# Patient Record
Sex: MALE
[De-identification: names, ages, dates, MRNs, and addresses within clinical notes are randomized per-mention and may not be internally consistent; named-entity substitution may affect disease eponyms.]

---

## 2022-10-06 PROBLEM — Z00.00 ENCOUNTER FOR PREVENTIVE HEALTH EXAMINATION: Status: ACTIVE | Noted: 2022-10-06

## 2022-10-07 ENCOUNTER — APPOINTMENT (OUTPATIENT)
Dept: ORTHOPEDIC SURGERY | Facility: CLINIC | Age: 57
End: 2022-10-07
Payer: OTHER MISCELLANEOUS

## 2022-10-07 VITALS — BODY MASS INDEX: 33.33 KG/M2 | WEIGHT: 225 LBS | HEIGHT: 69 IN

## 2022-10-07 DIAGNOSIS — Z78.9 OTHER SPECIFIED HEALTH STATUS: ICD-10-CM

## 2022-10-07 PROCEDURE — 99072 ADDL SUPL MATRL&STAF TM PHE: CPT

## 2022-10-07 PROCEDURE — 99204 OFFICE O/P NEW MOD 45 MIN: CPT | Mod: 25

## 2022-10-07 PROCEDURE — 72110 X-RAY EXAM L-2 SPINE 4/>VWS: CPT

## 2022-10-07 PROCEDURE — 20552 NJX 1/MLT TRIGGER POINT 1/2: CPT

## 2022-10-07 PROCEDURE — 72170 X-RAY EXAM OF PELVIS: CPT

## 2022-10-07 PROCEDURE — J3490M: CUSTOM

## 2022-10-07 RX ORDER — METHYLPREDNISOLONE 4 MG/1
4 TABLET ORAL
Qty: 1 | Refills: 1 | Status: ACTIVE | COMMUNITY
Start: 2022-10-07 | End: 1900-01-01

## 2022-10-07 NOTE — HISTORY OF PRESENT ILLNESS
[5] : 5 [7] : 7 [Dull/Aching] : dull/aching [Radiating] : radiating [Sharp] : sharp [Retired] : Work status: retired [de-identified] : WC DOI 3/4/14 Retired \par \par 10/7/22: 56 yo PARIS gagnon here for the evaluation of his low back pain. He reports he was lifting a heavy bag of grass at the time the pain initially started - that was the initial injury - He has gotten intermittent flare ups of pain since the injury; normally treated with ice. chirocare and rest. MRI was done at the time of the initial injury showing  herniated discs, stenosis and disc bulging. \par \par More recent flare up was 2 weeks ago without injury. Pain is to the right side of the back radiating into the right anterior thigh. There is tingling. No loss of b/b control. left leg is okay.  Pain with rest is present \par \par No hx ANASTASIA, prior spine surgery \par No acupuncuture, massage - had chirocare and PT in the past\par \par No prior surgeries\par No baseline medications\par \par No hx diabetes/cancer\par \par xrays today\par L-spine 4V: L5-S1 DDD \par AP pelvis: negative \par \par Retired [] : Post Surgical Visit: no [FreeTextEntry1] : L Spine [FreeTextEntry3] : 3/4/2014 [FreeTextEntry5] : 58 Y/O Ampidex M guillerminaal L spine S/P Injury at work  [FreeTextEntry7] : Into the R Thigh  [de-identified] : XR MRI  [de-identified] : XR MRI  [de-identified] : Sanitation

## 2022-10-07 NOTE — WORK
[Sprain/Strain] : sprain/strain [Was the competent medical cause of the injury] : was the competent medical cause of the injury [Are consistent with the injury] : are consistent with the injury [Consistent with my objective findings] : consistent with my objective findings [Does not reveal pre-existing condition(s) that may affect treatment/prognosis] : does not reveal pre-existing condition(s) that may affect treatment/prognosis [Cannot return to work because ________] : cannot return to work because [unfilled] [No Rx restrictions] : No Rx restrictions. [I actively supervised the healthcare provider named who provided these services] : I actively supervised the healthcare provider named who provided these services:

## 2022-10-07 NOTE — DISCUSSION/SUMMARY
[de-identified] : lumbar radiculopathy \par indicated for updated MRi \par MDP/flexeril \par TPI tolerated well

## 2022-10-07 NOTE — PROCEDURE
[Trigger point 1-2 muscle groups] : trigger point 1-2 muscle groups [Right] : of the right [Lumbar paraspinal muscle] : lumbar paraspinal muscle [Pain] : pain [Alcohol] : alcohol [Betadine] : betadine [Ethyl Chloride sprayed topically] : ethyl chloride sprayed topically [___ cc    1%] : Lidocaine ~Vcc of 1%  [___ cc    0.25%] : Bupivacaine (Marcaine) ~Vcc of 0.25%  [___ cc    10mg] : Triamcinolone (Kenalog) ~Vcc of 10 mg

## 2022-10-25 ENCOUNTER — FORM ENCOUNTER (OUTPATIENT)
Age: 57
End: 2022-10-25

## 2022-10-26 ENCOUNTER — APPOINTMENT (OUTPATIENT)
Dept: MRI IMAGING | Facility: CLINIC | Age: 57
End: 2022-10-26

## 2022-10-26 PROCEDURE — 99072 ADDL SUPL MATRL&STAF TM PHE: CPT

## 2022-10-26 PROCEDURE — 72148 MRI LUMBAR SPINE W/O DYE: CPT

## 2022-11-18 ENCOUNTER — APPOINTMENT (OUTPATIENT)
Dept: ORTHOPEDIC SURGERY | Facility: CLINIC | Age: 57
End: 2022-11-18

## 2022-11-18 VITALS — HEIGHT: 69 IN | WEIGHT: 235 LBS | BODY MASS INDEX: 34.8 KG/M2

## 2022-11-18 DIAGNOSIS — M47.26 OTHER SPONDYLOSIS WITH RADICULOPATHY, LUMBAR REGION: ICD-10-CM

## 2022-11-18 PROCEDURE — 99072 ADDL SUPL MATRL&STAF TM PHE: CPT

## 2022-11-18 PROCEDURE — 99214 OFFICE O/P EST MOD 30 MIN: CPT

## 2022-11-18 NOTE — HISTORY OF PRESENT ILLNESS
[0] : 0 [Dull/Aching] : dull/aching [Retired] : Work status: retired [de-identified] : WC DOI 3/4/14 Retired \par \par 10/7/22: 58 yo PARIS gagnon here for the evaluation of his low back pain. He reports he was lifting a heavy bag of grass at the time the pain initially started - that was the initial injury - He has gotten intermittent flare ups of pain since the injury; normally treated with ice. chirocare and rest. MRI was done at the time of the initial injury showing  herniated discs, stenosis and disc bulging. \par \par More recent flare up was 2 weeks ago without injury. Pain is to the right side of the back radiating into the right anterior thigh. There is tingling. No loss of b/b control. left leg is okay.  Pain with rest is present \par \par No hx ANASTASIA, prior spine surgery \par No acupuncuture, massage - had chirocare and PT in the past\par \par No prior surgeries\par No baseline medications\par \par No hx diabetes/cancer\par \par xrays today\par L-spine 4V: L5-S1 DDD \par AP pelvis: negative \par \par Retired\par \par 11/18/22: Here to review MRI - plan at last was "lumbar radiculopathy - indicated for updated MRi - MDP/flexeril - TPI tolerated well " - overall symptoms are improved - the medication helped\par \par MRI L spine: \par 1. Multiple disc herniations and extrusions most severe on the right at L1-L2 where there is caudal extension, severe right lateral recess stenosis and impingement upon intrathecal nerve roots at the L1-L2 level on the right as well as in the right lateral recess where there is severe right lateral recess stenosis posterior to the L2 vertebral body in the right.\par 2. Significant right lateral recess stenosis and right foraminal narrowing with right L3 and right exiting L2 nerve root impingement as well as the left exiting L2 nerve root impingement at L2-L3.\par 3. Mild central stenosis, left L4 nerve root impingement and bilateral exiting L3 nerve root impingement at L3-L4.\par 4. Bilateral L5 nerve root impingement left greater than right and left exiting L4 nerve root impingement at L4-L5.\par 5. Scattered degenerative endplate changes and anterior spondylosis with slight convexity of the mid to lower lumbar spine towards the right without acute fracture.\par 6. There is inflammatory change surrounding the right paracentral disc extrusion at L1-L2 suggesting acute inflammatory changes surrounding the extrusion. Consider repeat MRI to further evaluate for healing or repeat MRI with intravenous contrast material to evaluate for possible sequestered disc segment fragment as clinically indicated. [] : Post Surgical Visit: no [FreeTextEntry1] : L Spine [FreeTextEntry3] : 3/4/2014 [FreeTextEntry5] : 58 Y/O Ampidex M eval L spine S/P Injury at work. Pt reports pain is much better, has no pain currently, has some pain occasionally. [de-identified] : XR MRI  [de-identified] : XR MRI  [de-identified] : Sanitation

## 2022-11-18 NOTE — DISCUSSION/SUMMARY
[de-identified] : MRI reviewed with patient - has mutliple disc herniations - doing better clincially - will keep an eye on it

## 2022-11-18 NOTE — WORK
[Sprain/Strain] : sprain/strain [Was the competent medical cause of the injury] : was the competent medical cause of the injury [Are consistent with the injury] : are consistent with the injury [Consistent with my objective findings] : consistent with my objective findings [Does not reveal pre-existing condition(s) that may affect treatment/prognosis] : does not reveal pre-existing condition(s) that may affect treatment/prognosis [Cannot return to work because ________] : cannot return to work because [unfilled] [No Rx restrictions] : No Rx restrictions. [I actively supervised the healthcare provider named who provided these services] : I actively supervised the healthcare provider named who provided these services:  [id number]

## 2022-12-16 ENCOUNTER — APPOINTMENT (OUTPATIENT)
Dept: ORTHOPEDIC SURGERY | Facility: CLINIC | Age: 57
End: 2022-12-16

## 2022-12-16 VITALS — BODY MASS INDEX: 34.8 KG/M2 | WEIGHT: 235 LBS | HEIGHT: 69 IN

## 2022-12-16 PROCEDURE — 99072 ADDL SUPL MATRL&STAF TM PHE: CPT

## 2022-12-16 PROCEDURE — 99214 OFFICE O/P EST MOD 30 MIN: CPT | Mod: 25

## 2022-12-16 PROCEDURE — J3490M: CUSTOM

## 2022-12-16 PROCEDURE — 20552 NJX 1/MLT TRIGGER POINT 1/2: CPT

## 2022-12-16 NOTE — PROCEDURE
[Trigger point 1-2 muscle groups] : trigger point 1-2 muscle groups [Right] : of the right [Lumbar paraspinal muscle] : lumbar paraspinal muscle [Inflammation] : inflammation [Pain] : pain [Alcohol] : alcohol [Betadine] : betadine [Ethyl Chloride sprayed topically] : ethyl chloride sprayed topically [Sterile technique used] : sterile technique used [___ cc    1%] : Lidocaine ~Vcc of 1%  [___ cc    0.25%] : Bupivacaine (Marcaine) ~Vcc of 0.25%  [___ cc    10mg] : Triamcinolone (Kenalog) ~Vcc of 10 mg  [] : Patient tolerated procedure well [Call if redness, pain or fever occur] : call if redness, pain or fever occur [Apply ice for 15min out of every hour for the next 12-24 hours as tolerated] : apply ice for 15 minutes out of every hour for the next 12-24 hours as tolerated

## 2022-12-17 NOTE — DISCUSSION/SUMMARY
[de-identified] : For clarification, his current back pain is an exacerbation of and related to his case from 2014.\par TPI today - tolerated well\par \par Fu as already scheduled\par

## 2022-12-17 NOTE — HISTORY OF PRESENT ILLNESS
[3] : 3 [Intermittent] : intermittent [Household chores] : household chores [Leisure] : leisure [Injection therapy] : injection therapy [Sitting] : sitting [de-identified] : WC DOI 3/4/14 Retired \par \par 10/7/22: 56 yo PARIS gagnon here for the evaluation of his low back pain. He reports he was lifting a heavy bag of grass at the time the pain initially started - that was the initial injury - He has gotten intermittent flare ups of pain since the injury; normally treated with ice. chirocare and rest. MRI was done at the time of the initial injury showing  herniated discs, stenosis and disc bulging. \par \par More recent flare up was 2 weeks ago without injury. Pain is to the right side of the back radiating into the right anterior thigh. There is tingling. No loss of b/b control. left leg is okay.  Pain with rest is present \par \par No hx ANASTASIA, prior spine surgery \par No acupuncuture, massage - had chirocare and PT in the past\par \par No prior surgeries\par No baseline medications\par \par No hx diabetes/cancer\par \par xrays today\par L-spine 4V: L5-S1 DDD \par AP pelvis: negative \par \par Retired\par \par 11/18/22: Here to review MRI - plan at last was "lumbar radiculopathy - indicated for updated MRi - MDP/flexeril - TPI tolerated well " - overall symptoms are improved - the medication helped\par \par MRI L spine: \par 1. Multiple disc herniations and extrusions most severe on the right at L1-L2 where there is caudal extension, severe right lateral recess stenosis and impingement upon intrathecal nerve roots at the L1-L2 level on the right as well as in the right lateral recess where there is severe right lateral recess stenosis posterior to the L2 vertebral body in the right.\par 2. Significant right lateral recess stenosis and right foraminal narrowing with right L3 and right exiting L2 nerve root impingement as well as the left exiting L2 nerve root impingement at L2-L3.\par 3. Mild central stenosis, left L4 nerve root impingement and bilateral exiting L3 nerve root impingement at L3-L4.\par 4. Bilateral L5 nerve root impingement left greater than right and left exiting L4 nerve root impingement at L4-L5.\par 5. Scattered degenerative endplate changes and anterior spondylosis with slight convexity of the mid to lower lumbar spine towards the right without acute fracture.\par 6. There is inflammatory change surrounding the right paracentral disc extrusion at L1-L2 suggesting acute inflammatory changes surrounding the extrusion. Consider repeat MRI to further evaluate for healing or repeat MRI with intravenous contrast material to evaluate for possible sequestered disc segment fragment as clinically indicated.\par \par 12/16/22: Here for follow up. Having increased pain in low back since surgical excision of multiple lipomas 2 weeks ago. TPI ok per his oncologist. Last TPI was 2 months ago. [] : no [FreeTextEntry1] : emily  [FreeTextEntry5] : Pt is here to follow up on lspine. Pain has improved slightly since last visit. Trigger point injection from 10/07/22, gave relief, and patient is inquiring about a repeat. Pain is most prevalent with prolonged sitting; will feel stiffness. Not taking medications, icing, or heating.

## 2023-02-22 ENCOUNTER — APPOINTMENT (OUTPATIENT)
Dept: ORTHOPEDIC SURGERY | Facility: CLINIC | Age: 58
End: 2023-02-22
Payer: OTHER MISCELLANEOUS

## 2023-02-22 VITALS — BODY MASS INDEX: 34.8 KG/M2 | WEIGHT: 235 LBS | HEIGHT: 69 IN

## 2023-02-22 DIAGNOSIS — M54.16 RADICULOPATHY, LUMBAR REGION: ICD-10-CM

## 2023-02-22 PROCEDURE — 99072 ADDL SUPL MATRL&STAF TM PHE: CPT

## 2023-02-22 PROCEDURE — J3490M: CUSTOM

## 2023-02-22 PROCEDURE — 20552 NJX 1/MLT TRIGGER POINT 1/2: CPT

## 2023-02-22 PROCEDURE — 99214 OFFICE O/P EST MOD 30 MIN: CPT | Mod: 25

## 2023-02-22 RX ORDER — METHYLPREDNISOLONE 4 MG/1
4 TABLET ORAL
Qty: 1 | Refills: 0 | Status: ACTIVE | COMMUNITY
Start: 2023-02-22 | End: 1900-01-01

## 2023-02-22 NOTE — PROCEDURE
[Trigger point 1-2 muscle groups] : trigger point 1-2 muscle groups [Right] : of the right [Lumbar paraspinal muscle] : lumbar paraspinal muscle [Pain] : pain [Inflammation] : inflammation [Alcohol] : alcohol [Betadine] : betadine [Ethyl Chloride sprayed topically] : ethyl chloride sprayed topically [Sterile technique used] : sterile technique used [___ cc    1%] : Lidocaine ~Vcc of 1%  [___ cc    0.25%] : Bupivacaine (Marcaine) ~Vcc of 0.25%  [___ cc    10mg] : Triamcinolone (Kenalog) ~Vcc of 10 mg  [] : Patient tolerated procedure well [Call if redness, pain or fever occur] : call if redness, pain or fever occur [Apply ice for 15min out of every hour for the next 12-24 hours as tolerated] : apply ice for 15 minutes out of every hour for the next 12-24 hours as tolerated

## 2023-02-22 NOTE — HISTORY OF PRESENT ILLNESS
[3] : 3 [Radiating] : radiating [Intermittent] : intermittent [Household chores] : household chores [Leisure] : leisure [Injection therapy] : injection therapy [Sitting] : sitting [Part time] : Work status: part time [de-identified] : WC DOI 3/4/14 Retired \par \par 10/7/22: 56 yo [place]IS gagnon here for the evaluation of his low back pain. He reports he was lifting a heavy bag of grass at the time the pain initially started - that was the initial injury - He has gotten intermittent flare ups of pain since the injury; normally treated with ice. chirocare and rest. MRI was done at the time of the initial injury showing  herniated discs, stenosis and disc bulging. \par \par More recent flare up was 2 weeks ago without injury. Pain is to the right side of the back radiating into the right anterior thigh. There is tingling. No loss of b/b control. left leg is okay.  Pain with rest is present \par \par No hx ANASTASIA, prior spine surgery \par No acupuncuture, massage - had chirocare and PT in the past\par \par No prior surgeries\par No baseline medications\par \par No hx diabetes/cancer\par \par xrays today\par L-spine 4V: L5-S1 DDD \par AP pelvis: negative \par \par Retired\par \par 11/18/22: Here to review MRI - plan at last was "lumbar radiculopathy - indicated for updated MRi - MDP/flexeril - TPI tolerated well " - overall symptoms are improved - the medication helped\par \par MRI L spine: \par 1. Multiple disc herniations and extrusions most severe on the right at L1-L2 where there is caudal extension, severe right lateral recess stenosis and impingement upon intrathecal nerve roots at the L1-L2 level on the right as well as in the right lateral recess where there is severe right lateral recess stenosis posterior to the L2 vertebral body in the right.\par 2. Significant right lateral recess stenosis and right foraminal narrowing with right L3 and right exiting L2 nerve root impingement as well as the left exiting L2 nerve root impingement at L2-L3.\par 3. Mild central stenosis, left L4 nerve root impingement and bilateral exiting L3 nerve root impingement at L3-L4.\par 4. Bilateral L5 nerve root impingement left greater than right and left exiting L4 nerve root impingement at L4-L5.\par 5. Scattered degenerative endplate changes and anterior spondylosis with slight convexity of the mid to lower lumbar spine towards the right without acute fracture.\par 6. There is inflammatory change surrounding the right paracentral disc extrusion at L1-L2 suggesting acute inflammatory changes surrounding the extrusion. Consider repeat MRI to further evaluate for healing or repeat MRI with intravenous contrast material to evaluate for possible sequestered disc segment fragment as clinically indicated.\par \par 12/16/22: Here for follow up. Having increased pain in low back since surgical excision of multiple lipomas 2 weeks ago. TPI ok per his oncologist. Last TPI was 2 months ago.\par \par 2/22/23: here for a follow up on the lower spine, had a tpi last visit with good relief; increasing pain to the right thigh that is new that started a few weeks ago without injury. There is numbness to the right anterior thigh. He has been taking tylenol without relief.  No loss of b/b control.\par \par MDP has helped in the back  [] : no [FreeTextEntry1] : emily  [FreeTextEntry7] : right leg

## 2023-02-22 NOTE — DISCUSSION/SUMMARY
[de-identified] : reviewed the case - right sided L1-2 and L2-3 disc herniatoins with stenosis \par TPI today - tolerated well\par referred to pain for LESI 1-2 and L2-3\par would be a candidate for right sided decompression at L12 and L23  \par MDP \par if not getting better consider for surgical decomrpression \par \par

## 2023-03-01 RX ORDER — CYCLOBENZAPRINE HYDROCHLORIDE 10 MG/1
10 TABLET, FILM COATED ORAL
Qty: 30 | Refills: 0 | Status: ACTIVE | COMMUNITY
Start: 2022-10-07 | End: 1900-01-01

## 2023-03-15 ENCOUNTER — APPOINTMENT (OUTPATIENT)
Dept: PAIN MANAGEMENT | Facility: CLINIC | Age: 58
End: 2023-03-15

## 2023-03-15 DIAGNOSIS — M51.26 OTHER INTERVERTEBRAL DISC DISPLACEMENT, LUMBAR REGION: ICD-10-CM

## 2023-03-15 RX ORDER — GABAPENTIN 300 MG/1
300 CAPSULE ORAL
Qty: 60 | Refills: 0 | Status: ACTIVE | COMMUNITY
Start: 2023-03-15 | End: 1900-01-01